# Patient Record
Sex: MALE | Race: WHITE | NOT HISPANIC OR LATINO | Employment: OTHER | URBAN - NONMETROPOLITAN AREA
[De-identification: names, ages, dates, MRNs, and addresses within clinical notes are randomized per-mention and may not be internally consistent; named-entity substitution may affect disease eponyms.]

---

## 2023-01-03 RX ORDER — SIMVASTATIN 40 MG
40 TABLET ORAL
COMMUNITY

## 2023-01-03 RX ORDER — CITALOPRAM 40 MG/1
40 TABLET ORAL DAILY
COMMUNITY

## 2023-01-03 RX ORDER — ASPIRIN 81 MG/1
81 TABLET ORAL DAILY
COMMUNITY
End: 2023-01-11

## 2023-01-03 RX ORDER — PANTOPRAZOLE SODIUM 20 MG/1
20 TABLET, DELAYED RELEASE ORAL DAILY
COMMUNITY

## 2023-01-03 NOTE — PRE-PROCEDURE INSTRUCTIONS
Pre-Surgery Instructions:   Medication Instructions   • aspirin (ECOTRIN LOW STRENGTH) 81 mg EC tablet Instructions provided by MD   • Bioflavonoid Products (VITAMIN C PLUS PO) Stop taking 7 days prior to surgery  • citalopram (CeleXA) 40 mg tablet Take night before surgery   • pantoprazole (PROTONIX) 20 mg tablet Take night before surgery   • simvastatin (ZOCOR) 40 mg tablet Take night before surgery   • VITAMIN D PO Stop taking 7 days prior to surgery  • Zinc Acetate, Oral, (ZINC ACETATE PO) Stop taking 7 days prior to surgery     See above    No aspirin or NSAIDS

## 2023-01-04 ENCOUNTER — ANESTHESIA EVENT (OUTPATIENT)
Dept: PERIOP | Facility: HOSPITAL | Age: 68
End: 2023-01-04

## 2023-01-04 DIAGNOSIS — Z01.818 PRE-OP EXAM: Primary | ICD-10-CM

## 2023-01-06 ENCOUNTER — APPOINTMENT (OUTPATIENT)
Dept: LAB | Facility: HOSPITAL | Age: 68
End: 2023-01-06

## 2023-01-06 DIAGNOSIS — Z01.818 PRE-OP EXAM: ICD-10-CM

## 2023-01-06 LAB
APTT PPP: 26 SECONDS (ref 23–37)
INR PPP: 0.93 (ref 0.84–1.19)
PROTHROMBIN TIME: 12.6 SECONDS (ref 11.6–14.5)

## 2023-01-11 ENCOUNTER — ANESTHESIA (OUTPATIENT)
Dept: PERIOP | Facility: HOSPITAL | Age: 68
End: 2023-01-11

## 2023-01-11 ENCOUNTER — HOSPITAL ENCOUNTER (OUTPATIENT)
Facility: HOSPITAL | Age: 68
Setting detail: OUTPATIENT SURGERY
Discharge: HOME | End: 2023-01-11
Attending: OTOLARYNGOLOGY | Admitting: OTOLARYNGOLOGY

## 2023-01-11 VITALS
OXYGEN SATURATION: 97 % | DIASTOLIC BLOOD PRESSURE: 83 MMHG | HEIGHT: 69 IN | TEMPERATURE: 97.4 F | BODY MASS INDEX: 27.11 KG/M2 | WEIGHT: 183 LBS | RESPIRATION RATE: 20 BRPM | HEART RATE: 68 BPM | SYSTOLIC BLOOD PRESSURE: 146 MMHG

## 2023-01-11 DIAGNOSIS — J38.3 OTHER DISEASES OF VOCAL CORDS: ICD-10-CM

## 2023-01-11 DIAGNOSIS — R59.0 LOCALIZED ENLARGED LYMPH NODES: ICD-10-CM

## 2023-01-11 DIAGNOSIS — R49.0 DYSPHONIA: ICD-10-CM

## 2023-01-11 LAB — GLUCOSE SERPL-MCNC: 97 MG/DL (ref 65–140)

## 2023-01-11 RX ORDER — HYDROMORPHONE HCL IN WATER/PF 6 MG/30 ML
0.2 PATIENT CONTROLLED ANALGESIA SYRINGE INTRAVENOUS
Status: DISCONTINUED | OUTPATIENT
Start: 2023-01-11 | End: 2023-01-11 | Stop reason: HOSPADM

## 2023-01-11 RX ORDER — METOCLOPRAMIDE HYDROCHLORIDE 5 MG/ML
10 INJECTION INTRAMUSCULAR; INTRAVENOUS ONCE AS NEEDED
Status: DISCONTINUED | OUTPATIENT
Start: 2023-01-11 | End: 2023-01-11 | Stop reason: HOSPADM

## 2023-01-11 RX ORDER — DEXAMETHASONE SODIUM PHOSPHATE 10 MG/ML
INJECTION, SOLUTION INTRAMUSCULAR; INTRAVENOUS AS NEEDED
Status: DISCONTINUED | OUTPATIENT
Start: 2023-01-11 | End: 2023-01-11

## 2023-01-11 RX ORDER — HYDROMORPHONE HCL/PF 1 MG/ML
0.5 SYRINGE (ML) INJECTION
Status: DISCONTINUED | OUTPATIENT
Start: 2023-01-11 | End: 2023-01-11 | Stop reason: HOSPADM

## 2023-01-11 RX ORDER — ALBUTEROL SULFATE 2.5 MG/3ML
2.5 SOLUTION RESPIRATORY (INHALATION) ONCE AS NEEDED
Status: DISCONTINUED | OUTPATIENT
Start: 2023-01-11 | End: 2023-01-11 | Stop reason: HOSPADM

## 2023-01-11 RX ORDER — ONDANSETRON 2 MG/ML
4 INJECTION INTRAMUSCULAR; INTRAVENOUS ONCE AS NEEDED
Status: DISCONTINUED | OUTPATIENT
Start: 2023-01-11 | End: 2023-01-11 | Stop reason: HOSPADM

## 2023-01-11 RX ORDER — FENTANYL CITRATE/PF 50 MCG/ML
50 SYRINGE (ML) INJECTION
Status: DISCONTINUED | OUTPATIENT
Start: 2023-01-11 | End: 2023-01-11 | Stop reason: HOSPADM

## 2023-01-11 RX ORDER — LIDOCAINE HYDROCHLORIDE 10 MG/ML
0.5 INJECTION, SOLUTION EPIDURAL; INFILTRATION; INTRACAUDAL; PERINEURAL ONCE AS NEEDED
Status: DISCONTINUED | OUTPATIENT
Start: 2023-01-11 | End: 2023-01-11 | Stop reason: HOSPADM

## 2023-01-11 RX ORDER — SERTRALINE HYDROCHLORIDE 100 MG/1
100 TABLET, FILM COATED ORAL DAILY
COMMUNITY
Start: 2022-10-19

## 2023-01-11 RX ORDER — SODIUM CHLORIDE, SODIUM LACTATE, POTASSIUM CHLORIDE, CALCIUM CHLORIDE 600; 310; 30; 20 MG/100ML; MG/100ML; MG/100ML; MG/100ML
125 INJECTION, SOLUTION INTRAVENOUS CONTINUOUS
Status: DISCONTINUED | OUTPATIENT
Start: 2023-01-11 | End: 2023-01-11 | Stop reason: HOSPADM

## 2023-01-11 RX ORDER — FENTANYL CITRATE 50 UG/ML
INJECTION, SOLUTION INTRAMUSCULAR; INTRAVENOUS AS NEEDED
Status: DISCONTINUED | OUTPATIENT
Start: 2023-01-11 | End: 2023-01-11

## 2023-01-11 RX ORDER — DIPHENHYDRAMINE HYDROCHLORIDE 50 MG/ML
12.5 INJECTION INTRAMUSCULAR; INTRAVENOUS ONCE AS NEEDED
Status: DISCONTINUED | OUTPATIENT
Start: 2023-01-11 | End: 2023-01-11 | Stop reason: HOSPADM

## 2023-01-11 RX ORDER — ONDANSETRON 2 MG/ML
INJECTION INTRAMUSCULAR; INTRAVENOUS AS NEEDED
Status: DISCONTINUED | OUTPATIENT
Start: 2023-01-11 | End: 2023-01-11

## 2023-01-11 RX ORDER — HYDROCODONE BITARTRATE AND ACETAMINOPHEN 5; 325 MG/1; MG/1
2 TABLET ORAL EVERY 6 HOURS PRN
Qty: 30 TABLET | Refills: 0 | Status: SHIPPED | OUTPATIENT
Start: 2023-01-11 | End: 2023-01-21

## 2023-01-11 RX ORDER — LIDOCAINE HYDROCHLORIDE 10 MG/ML
INJECTION, SOLUTION EPIDURAL; INFILTRATION; INTRACAUDAL; PERINEURAL AS NEEDED
Status: DISCONTINUED | OUTPATIENT
Start: 2023-01-11 | End: 2023-01-11

## 2023-01-11 RX ORDER — MIDAZOLAM HYDROCHLORIDE 2 MG/2ML
INJECTION, SOLUTION INTRAMUSCULAR; INTRAVENOUS AS NEEDED
Status: DISCONTINUED | OUTPATIENT
Start: 2023-01-11 | End: 2023-01-11

## 2023-01-11 RX ORDER — ROCURONIUM BROMIDE 10 MG/ML
INJECTION, SOLUTION INTRAVENOUS AS NEEDED
Status: DISCONTINUED | OUTPATIENT
Start: 2023-01-11 | End: 2023-01-11

## 2023-01-11 RX ORDER — FENTANYL CITRATE/PF 50 MCG/ML
25 SYRINGE (ML) INJECTION
Status: DISCONTINUED | OUTPATIENT
Start: 2023-01-11 | End: 2023-01-11 | Stop reason: HOSPADM

## 2023-01-11 RX ORDER — PROPOFOL 10 MG/ML
INJECTION, EMULSION INTRAVENOUS AS NEEDED
Status: DISCONTINUED | OUTPATIENT
Start: 2023-01-11 | End: 2023-01-11

## 2023-01-11 RX ORDER — CEPHALEXIN 500 MG/1
500 CAPSULE ORAL EVERY 12 HOURS SCHEDULED
Qty: 20 CAPSULE | Refills: 0 | Status: SHIPPED | OUTPATIENT
Start: 2023-01-11 | End: 2023-01-21

## 2023-01-11 RX ADMIN — FENTANYL CITRATE 100 MCG: 50 INJECTION INTRAMUSCULAR; INTRAVENOUS at 07:41

## 2023-01-11 RX ADMIN — SODIUM CHLORIDE, SODIUM LACTATE, POTASSIUM CHLORIDE, AND CALCIUM CHLORIDE: .6; .31; .03; .02 INJECTION, SOLUTION INTRAVENOUS at 07:30

## 2023-01-11 RX ADMIN — SUGAMMADEX 400 MG: 100 INJECTION, SOLUTION INTRAVENOUS at 08:00

## 2023-01-11 RX ADMIN — LIDOCAINE HYDROCHLORIDE 50 MG: 10 INJECTION, SOLUTION EPIDURAL; INFILTRATION; INTRACAUDAL at 07:41

## 2023-01-11 RX ADMIN — PROPOFOL 80 MG: 10 INJECTION, EMULSION INTRAVENOUS at 07:54

## 2023-01-11 RX ADMIN — ONDANSETRON 4 MG: 2 INJECTION INTRAMUSCULAR; INTRAVENOUS at 07:41

## 2023-01-11 RX ADMIN — PROPOFOL 120 MG: 10 INJECTION, EMULSION INTRAVENOUS at 07:41

## 2023-01-11 RX ADMIN — ROCURONIUM BROMIDE 40 MG: 10 INJECTION, SOLUTION INTRAVENOUS at 07:41

## 2023-01-11 RX ADMIN — DEXAMETHASONE SODIUM PHOSPHATE 10 MG: 10 INJECTION, SOLUTION INTRAMUSCULAR; INTRAVENOUS at 07:41

## 2023-01-11 RX ADMIN — MIDAZOLAM 2 MG: 1 INJECTION INTRAMUSCULAR; INTRAVENOUS at 07:32

## 2023-01-11 NOTE — DISCHARGE INSTR - AVS FIRST PAGE
Use voice conservatively and avoid extremes of speech such as whispering or shouting  The Band-Aid can be removed later today and you can shower normally

## 2023-01-11 NOTE — DISCHARGE SUMMARY
Discharge Summary - Maykel Cordero 74 y o  male MRN: [id number]    Unit/Bed#: [facility] Encounter: [id number]    Admission Date:     Admitting Diagnosis: Dysphonia [R49 0]  Other diseases of vocal cords [J38 3]  Localized enlarged lymph nodes [R59 0]    HPI: Status post MDL with left vocal cord biopsy, flexible esophagoscopy, flexible bronchoscopy and fine-needle aspiration of left submandibular lymph node  Procedures Performed: No orders of the defined types were placed in this encounter  Summary of Hospital Course: Unremarkable    Significant Findings, Care, Treatment and Services Provided: Surgery    Complications: None    Discharge Diagnosis: Vocal lesion, reflux and COPD    Medical Problems     Resolved Problems  Date Reviewed: 1/10/2023   None         Condition at Discharge: good         Discharge instructions/Information to patient and family:   See after visit summary for information provided to patient and family  Provisions for Follow-Up Care:  See after visit summary for information related to follow-up care and any pertinent home health orders  PCP: No primary care provider on file  Disposition: Home    Planned Readmission: No      Discharge Statement   I spent 15 minutes discharging the patient  This time was spent on the day of discharge  I had direct contact with the patient on the day of discharge  Additional documentation is required if more than 30 minutes were spent on discharge  Discharge Medications:  See after visit summary for reconciled discharge medications provided to patient and family

## 2023-01-11 NOTE — OP NOTE
OPERATIVE REPORT  PATIENT NAME: Burton Desouza    :  1955  MRN: [id number]  Pt Location: [facility] OR ROOM 02    SURGERY DATE: 2023    Surgeon(s) and Role:     * Yulissa Merino MD - Primary    Preop Diagnosis:  Dysphonia [R49 0]  Other diseases of vocal cords [J38 3]  Localized enlarged lymph nodes [R59 0]    Post-Op Diagnosis Codes: * Dysphonia [R49 0]     * Other diseases of vocal cords [J38 3]     * Localized enlarged lymph nodes [R59 0]    Procedure(s) (LRB):  MICRO DIRECT LARYNGOSCOPY WITH VOCAL FOLD STRIPPING WITH FROZEN SECTION ANALYSIS; FLEXIBLE ESOPHAGOSCOPY; FLEXIBLE BRONCHOSCOPY (N/A)  SUBMANDIBULAR GLAND FINE NEEDLE ASPIRATION (Left)    Specimen(s):  * No specimens in log *    Estimated Blood Loss:   Minimal    Drains:  * No LDAs found *    Anesthesia Type:   General    Operative Indications:  Dysphonia [R49 0]  Other diseases of vocal cords [J38 3]  Localized enlarged lymph nodes [R59 0]      Operative Findings:      Complications:   None    Procedure and Technique:  The patient was identified and taken to the operative suite  A timeout weas called  A 6 0 endotracheal tube was used for intubation after the successful induction of general anesthesia and it was taped to the left lower lip  The table was turned and the [facility] blade was used to cannulate the esophageal inlet and the flexible esophagoscope was inserted into the esophagus under direct visualization  Using air insufflation, the esophagus was examined circumferentially to the GE junction  The GE junction was found to be irregular  There was not a hiatal hernia present  The stomach was examined to the pylorus and retroflexion showed the body and antrum as well  The scope was then withdrawn  Next, the Dedo laryngoscope was placed into position, offering adequate visualization of the vocal cords  The suspension apparatus was attached and suspended from the [employer] stand    Using the microscope on a setting of 400, the roshan cords were visualized  The left vocal folds was stripped anteriorly to posteriorly, sparing the vocalis muscle, removing the leukoplakic lesion    Next, the slim bronchoscope was used, with the [facility] blade's guidance to go around the endotracheal tube with the cuff deflated and visualized the tracheal rings and grady which were sharp  Segmental bronchi bilaterally were visualized and found to be without lesions, although mucous plugging was relieved  The scope was withdrawn  The vocal folds were reinspected and no significant bleeding was encountered  The suspension apparatus was relieved, the microscope withdrawn and the Dedo laryngoscope withdrawn and an oral airway was placed  Next, an alcohol swab in the submandibular triangle on the left side was used to clean the skin a 22-gauge needle on a 10 cc syringe was inserted into the submandibular area of concern  Multiple passes were made through and the specimen was placed into CytoLyt  The site was cleaned and a Band-Aid was placed  There was no additional bleeding  The patient was awakened and extubated without incident and taken to the PACU in excellent condition  Instrument and sponge counts were correct x2 at the end of the case     I was present for the entire procedure    Patient Disposition:  PACU         SIGNATURE: Lacy Layton MD  DATE: January 11, 2023  TIME: 7:36 AM

## 2023-01-11 NOTE — INTERVAL H&P NOTE
H&P reviewed  After examining the patient I find no changes in the patients condition since the H&P had been written      Vitals:    01/11/23 0622   BP: 133/78   Pulse: 69   Resp: 20   Temp: 97 5 °F (36 4 °C)   SpO2: 97%

## 2023-01-11 NOTE — ANESTHESIA POSTPROCEDURE EVALUATION
Post-Op Assessment Note    CV Status:  Stable  Pain Score: 0    Pain management: adequate     Mental Status:  Alert and awake   Hydration Status:  Euvolemic   PONV Controlled:  Controlled   Airway Patency:  Patent      Post Op Vitals Reviewed: Yes      Staff: CRNA         No notable events documented      /80 (01/11/23 0810)    Temp 98 1 °F (36 7 °C) (01/11/23 0810)    Pulse 64 (01/11/23 0810)   Resp 20 (01/11/23 0810)    SpO2 98 % (01/11/23 0810)

## 2023-01-11 NOTE — ANESTHESIA PREPROCEDURE EVALUATION
Procedure:  MICRO DIRECT LARYNGOSCOPY WITH VOCAL FOLD STRIPPING WITH FROZEN SECTION ANALYSIS; FLEXIBLE ESOPHAGOSCOPY; FLEXIBLE BRONCHOSCOPY (Throat)  SUBMANDIBULAR GLAND FINE NEEDLE ASPIRATION (Left: Neck)    Relevant Problems   ANESTHESIA (within normal limits)      CARDIO   (+) Hyperlipidemia      ENDO (within normal limits)      GI/HEPATIC   (+) GERD (gastroesophageal reflux disease)      /RENAL (within normal limits)      HEMATOLOGY (within normal limits)      MUSCULOSKELETAL (within normal limits)      NEURO/PSYCH   (+) Anxiety      PULMONARY (within normal limits)      Other   (+) Neck mass        Physical Exam    Airway    Mallampati score: II  TM Distance: >3 FB  Neck ROM: full     Dental   No notable dental hx     Cardiovascular  Rhythm: regular, Rate: normal, Cardiovascular exam normal    Pulmonary  Pulmonary exam normal Breath sounds clear to auscultation,     Other Findings        Anesthesia Plan  ASA Score- 2     Anesthesia Type- general with ASA Monitors  Additional Monitors:   Airway Plan: ETT  Plan Factors-Exercise tolerance (METS): >4 METS  Chart reviewed  Existing labs reviewed  Patient summary reviewed  Induction- intravenous  Postoperative Plan- Plan for postoperative opioid use  Informed Consent- Anesthetic plan and risks discussed with patient  I personally reviewed this patient with the CRNA  Discussed and agreed on the Anesthesia Plan with the CRNA  Modesto Uriarte Recent labs personally reviewed:  No results found for: WBC, HGB, PLT  No results found for: NA, K, BUN, CREATININE, GLUCOSE  Lab Results   Component Value Date    PTT 26 01/06/2023      Lab Results   Component Value Date    INR 0 93 01/06/2023       Lab Results   Component Value Date    HGBA1C 5 9 (H) 08/11/2022       I, Regan Peck MD, have personally seen and evaluated the patient prior to anesthetic care    I have reviewed the pre-anesthetic record, and other medical records if appropriate to the anesthetic care  If a CRNA is involved in the case, I have reviewed the CRNA assessment, if present, and agree  Risks/benefits and alternatives discussed with patient including possible PONV, sore throat, and possibility of rare anesthetic and surgical emergencies

## 2023-01-23 NOTE — ADDENDUM NOTE
Addendum  created 01/23/23 0737 by July Guzman CRNA    Clinical Note Signed, Intraprocedure Event edited, Intraprocedure Meds edited

## 2023-02-14 ENCOUNTER — OFFICE VISIT (OUTPATIENT)
Dept: URGENT CARE | Facility: CLINIC | Age: 68
End: 2023-02-14

## 2023-02-14 VITALS
BODY MASS INDEX: 26.66 KG/M2 | DIASTOLIC BLOOD PRESSURE: 90 MMHG | OXYGEN SATURATION: 99 % | HEART RATE: 88 BPM | TEMPERATURE: 97.2 F | RESPIRATION RATE: 16 BRPM | WEIGHT: 180 LBS | SYSTOLIC BLOOD PRESSURE: 132 MMHG | HEIGHT: 69 IN

## 2023-02-14 DIAGNOSIS — K52.9 NONINFECTIOUS GASTROENTERITIS, UNSPECIFIED TYPE: Primary | ICD-10-CM

## 2023-02-14 RX ORDER — CETIRIZINE HYDROCHLORIDE 10 MG/1
10 TABLET ORAL DAILY
COMMUNITY
Start: 2023-02-02

## 2023-02-14 RX ORDER — MAGNESIUM HYDROXIDE/ALUMINUM HYDROXICE/SIMETHICONE 120; 1200; 1200 MG/30ML; MG/30ML; MG/30ML
30 SUSPENSION ORAL ONCE
Status: COMPLETED | OUTPATIENT
Start: 2023-02-14 | End: 2023-02-14

## 2023-02-14 RX ADMIN — MAGNESIUM HYDROXIDE/ALUMINUM HYDROXICE/SIMETHICONE 30 ML: 120; 1200; 1200 SUSPENSION ORAL at 13:21

## 2023-02-14 NOTE — PATIENT INSTRUCTIONS
Continue with supportive measures, OTC Tylenol, increased fluid intake and rest   Advance diet as tolerated but start with bland foods   Follow up with PCP in 3-5 days  Present to ER if symptoms worsen     Gastroenteritis   AMBULATORY CARE:   Gastroenteritis , or stomach flu, is an infection of the stomach and intestines  It is caused by bacteria, parasites, or viruses  Common symptoms include the following:   Diarrhea or gas    Nausea, vomiting, or poor appetite    Abdominal cramps, pain, or gurgling    Fever    Tiredness or weakness    Headaches or muscle aches with any of the above symptoms    Call 911 for any of the following: You have trouble breathing or a very fast pulse  Seek care immediately if:   You see blood in your diarrhea  You cannot stop vomiting  You have not urinated for 12 hours  You feel like you are going to faint  Contact your healthcare provider if:   You have a fever  You continue to vomit or have diarrhea, even after treatment  You see worms in your diarrhea  Your mouth or eyes are dry  You are not urinating as much or as often  You have questions or concerns about your condition or care  Treatment for gastroenteritis  may include medicines to slow or stop your diarrhea or vomiting  You may also need medicines to treat an infection caused by bacteria or a parasite  Manage your symptoms:   Drink liquids as directed  Ask your healthcare provider how much liquid to drink each day, and which liquids are best for you  You may also need to drink an oral rehydration solution (ORS)  An ORS has the right amounts of sugar, salt, and minerals in water to replace body fluids  Eat bland foods  When you feel hungry, begin eating soft, bland foods  Examples are bananas, clear soup, potatoes, and applesauce  Do not have dairy products, alcohol, sugary drinks, or drinks with caffeine until you feel better  Rest as much as possible    Slowly start to do more each day when you begin to feel better  Prevent the spread of germs:  Gastroenteritis can spread easily  Keep yourself, your family, and your surroundings clean to help prevent the spread of gastroenteritis:  Wash your hands often  Use soap and water  Wash your hands after you use the bathroom, change a child's diapers, or sneeze  Wash your hands before you prepare or eat food  Clean surfaces and do laundry often  Wash your clothes and towels separately from the rest of the laundry  Clean surfaces in your home with antibacterial  or bleach  Clean food thoroughly and cook safely  Wash raw vegetables before you cook  Cook meat, fish, and eggs fully  Do not use the same dishes for raw meat as you do for other foods  Refrigerate any leftover food immediately  Be aware when you camp or travel  Drink only clean water  Do not drink from rivers or lakes unless you purify or boil the water first  When you travel, drink bottled water and do not add ice  Do not eat fruit that has not been peeled  Do not eat raw fish or meat that is not fully cooked  Follow up with your doctor as directed:  Write down your questions so you remember to ask them during your visits  © Copyright [employer] 2022 Information is for End User's use only and may not be sold, redistributed or otherwise used for commercial purposes  All illustrations and images included in CareNotes® are the copyrighted property of [employer]  or Missy Borges  The above information is an  only  It is not intended as medical advice for individual conditions or treatments  Talk to your doctor, nurse or pharmacist before following any medical regimen to see if it is safe and effective for you

## 2023-02-14 NOTE — PROGRESS NOTES
[facility]        NAME: Bennie Baumgarten is a 74 y o  male  : 1955    MRN: [id number]  DATE: 2023  TIME: 1:21 PM    Assessment and Plan   Noninfectious gastroenteritis, unspecified type [K52 9]  1  Noninfectious gastroenteritis, unspecified type  aluminum-magnesium hydroxide-simethicone (MYLANTA) oral suspension 30 mL        Mylanta given in clinic  Symptoms likely related to viral etiology and encouraged continued supportive measures  Advance diet as tolerated  Increase fluid intake  Follow up with PCP in 3-5 days or proceed to ED if symptoms worsen  Patient verbalized understanding of instructions given  Patient Instructions     Patient Instructions     Continue with supportive measures, OTC Tylenol, increased fluid intake and rest   Advance diet as tolerated but start with bland foods   Follow up with PCP in 3-5 days  Present to ER if symptoms worsen     Gastroenteritis   AMBULATORY CARE:   Gastroenteritis , or stomach flu, is an infection of the stomach and intestines  It is caused by bacteria, parasites, or viruses  Common symptoms include the following:   · Diarrhea or gas    · Nausea, vomiting, or poor appetite    · Abdominal cramps, pain, or gurgling    · Fever    · Tiredness or weakness    · Headaches or muscle aches with any of the above symptoms    Call 911 for any of the following:   · You have trouble breathing or a very fast pulse  Seek care immediately if:   · You see blood in your diarrhea  · You cannot stop vomiting  · You have not urinated for 12 hours  · You feel like you are going to faint  Contact your healthcare provider if:   · You have a fever  · You continue to vomit or have diarrhea, even after treatment  · You see worms in your diarrhea  · Your mouth or eyes are dry  You are not urinating as much or as often  · You have questions or concerns about your condition or care      Treatment for gastroenteritis  may include medicines to slow or stop your diarrhea or vomiting  You may also need medicines to treat an infection caused by bacteria or a parasite  Manage your symptoms:   · Drink liquids as directed  Ask your healthcare provider how much liquid to drink each day, and which liquids are best for you  You may also need to drink an oral rehydration solution (ORS)  An ORS has the right amounts of sugar, salt, and minerals in water to replace body fluids  · Eat bland foods  When you feel hungry, begin eating soft, bland foods  Examples are bananas, clear soup, potatoes, and applesauce  Do not have dairy products, alcohol, sugary drinks, or drinks with caffeine until you feel better  · Rest as much as possible  Slowly start to do more each day when you begin to feel better  Prevent the spread of germs:  Gastroenteritis can spread easily  Keep yourself, your family, and your surroundings clean to help prevent the spread of gastroenteritis:  · Wash your hands often  Use soap and water  Wash your hands after you use the bathroom, change a child's diapers, or sneeze  Wash your hands before you prepare or eat food  · Clean surfaces and do laundry often  Wash your clothes and towels separately from the rest of the laundry  Clean surfaces in your home with antibacterial  or bleach  · Clean food thoroughly and cook safely  Wash raw vegetables before you cook  Cook meat, fish, and eggs fully  Do not use the same dishes for raw meat as you do for other foods  Refrigerate any leftover food immediately  · Be aware when you camp or travel  Drink only clean water  Do not drink from rivers or lakes unless you purify or boil the water first  When you travel, drink bottled water and do not add ice  Do not eat fruit that has not been peeled  Do not eat raw fish or meat that is not fully cooked  Follow up with your doctor as directed:  Write down your questions so you remember to ask them during your visits  © Copyright [employer] 2022 Information is for End User's use only and may not be sold, redistributed or otherwise used for commercial purposes  All illustrations and images included in CareNotes® are the copyrighted property of [employer]  or Missy Borges  The above information is an  only  It is not intended as medical advice for individual conditions or treatments  Talk to your doctor, nurse or pharmacist before following any medical regimen to see if it is safe and effective for you  Chief Complaint     Chief Complaint   Patient presents with   • Abdominal Pain     Periumbilical abdominal pain intermittently x 1 week with diarrhea x 2 daily  No nausea         History of Present Illness       81-year-old male with a past medical history significant for GERD presents with complaints of intermittent episodes of generalized abdominal pain, nausea, and diarrhea x4 days  Patient describes abdominal pain as "growling" and is accompanied by belching and sour taste in mouth  States symptoms are sporadic and he has been having about 2 episodes of diarrhea daily  Symptoms do not occur following food intake as he has had decreased appetite but is drinking well  He has been taking OTC Imodium and Pepto-Bismol  Denies any fever, chills, vomiting, or blood in stool  No chest pain or shortness of breath  Denies any known sick contacts or exposures although wife is currently ill however with differing symptoms  Review of Systems   Review of Systems   Constitutional: Negative for chills and fever  Respiratory: Negative for shortness of breath  Cardiovascular: Negative for chest pain  Gastrointestinal: Positive for abdominal pain, diarrhea and nausea  Negative for blood in stool and vomiting  Genitourinary: Negative for decreased urine volume and difficulty urinating  Musculoskeletal: Negative for myalgias  Skin: Negative for rash           Current Medications Current Outpatient Medications:   •  Bioflavonoid Products (VITAMIN C PLUS PO), Take 1 tablet by mouth in the morning, Disp: , Rfl:   •  cetirizine (ZyrTEC) 10 mg tablet, Take 10 mg by mouth daily, Disp: , Rfl:   •  citalopram (CeleXA) 40 mg tablet, Take 40 mg by mouth daily, Disp: , Rfl:   •  pantoprazole (PROTONIX) 20 mg tablet, Take 20 mg by mouth daily, Disp: , Rfl:   •  sertraline (ZOLOFT) 100 mg tablet, Take 100 mg by mouth daily, Disp: , Rfl:   •  simvastatin (ZOCOR) 40 mg tablet, Take 40 mg by mouth daily at bedtime, Disp: , Rfl:   •  VITAMIN D PO, Take 1 tablet by mouth in the morning, Disp: , Rfl:   •  Zinc Acetate, Oral, (ZINC ACETATE PO), Take 1 tablet by mouth in the morning, Disp: , Rfl:     Current Facility-Administered Medications:   •  aluminum-magnesium hydroxide-simethicone (MYLANTA) oral suspension 30 mL, 30 mL, Oral, Once, Ysidro Cockayne, CRNP    Current Allergies     Allergies as of 02/14/2023   • (No Known Allergies)            The following portions of the patient's history were reviewed and updated as appropriate: allergies, current medications, past family history, past medical history, past social history, past surgical history and problem list      Past Medical History:   Diagnosis Date   • Anxiety    • COVID-19     pt had in 2021- does not remember date   • GERD (gastroesophageal reflux disease)    • Hyperlipidemia    • Kidney stone     Pt reports it passed on it's own   • Neck mass    • Vitamin D deficiency    • Wears partial dentures        Past Surgical History:   Procedure Laterality Date   • CARPAL TUNNEL RELEASE Bilateral    • KNEE ARTHROSCOPY Right    • [place] [address] EXC BAN&/STRPG CORDS/EPIGL MCRSCP/TLSCP N/A 1/11/2023    Procedure: MICRO DIRECT LARYNGOSCOPY WITH VOCAL FOLD STRIPPING WITH FROZEN SECTION ANALYSIS; FLEXIBLE ESOPHAGOSCOPY; FLEXIBLE BRONCHOSCOPY;  Surgeon: Jimmy Howell MD;  Location:  MAIN OR;  Service: ENT   • SUBMANDIBULAR GLAND EXCISION Left 1/11/2023 Procedure: SUBMANDIBULAR GLAND FINE NEEDLE ASPIRATION;  Surgeon: Rafael Patel MD;  Location:  MAIN OR;  Service: ENT       History reviewed  No pertinent family history  Medications have been verified  Objective   /90   Pulse 88   Temp (!) 97 2 °F (36 2 °C)   Resp 16   Ht 5' 9" (1 753 m)   Wt 81 6 kg (180 lb)   SpO2 99%   BMI 26 58 kg/m²   No LMP for male patient  Physical Exam     Physical Exam  Vitals and nursing note reviewed  Constitutional:       General: He is not in acute distress  Appearance: He is not toxic-appearing  HENT:      Head: Normocephalic  Right Ear: Tympanic membrane, ear canal and external ear normal       Left Ear: Tympanic membrane, ear canal and external ear normal       Nose: Nose normal       Mouth/Throat:      Mouth: Mucous membranes are moist       Pharynx: Oropharynx is clear  No posterior oropharyngeal erythema  Eyes:      Conjunctiva/sclera: Conjunctivae normal    Cardiovascular:      Rate and Rhythm: Normal rate and regular rhythm  Heart sounds: Normal heart sounds  Pulmonary:      Effort: Pulmonary effort is normal  No respiratory distress  Breath sounds: Normal breath sounds  No stridor  No wheezing, rhonchi or rales  Abdominal:      General: Bowel sounds are normal       Palpations: Abdomen is soft  Tenderness: There is no abdominal tenderness  Lymphadenopathy:      Cervical: No cervical adenopathy  Skin:     General: Skin is warm and dry  Neurological:      Mental Status: He is alert and oriented to person, place, and time  Gait: Gait is intact     Psychiatric:         Mood and Affect: Mood normal          Behavior: Behavior normal

## 2023-10-31 ENCOUNTER — OFFICE VISIT (OUTPATIENT)
Dept: URGENT CARE | Facility: CLINIC | Age: 68
End: 2023-10-31
Payer: COMMERCIAL

## 2023-10-31 VITALS
SYSTOLIC BLOOD PRESSURE: 144 MMHG | TEMPERATURE: 97 F | HEART RATE: 85 BPM | DIASTOLIC BLOOD PRESSURE: 82 MMHG | RESPIRATION RATE: 18 BRPM | OXYGEN SATURATION: 97 %

## 2023-10-31 DIAGNOSIS — Z23 ENCOUNTER FOR IMMUNIZATION: ICD-10-CM
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
## 2023-10-31 DIAGNOSIS — S30.861A TICK BITE OF ABDOMEN, INITIAL ENCOUNTER: Primary | ICD-10-CM

## 2023-10-31 PROCEDURE — S9088 SERVICES PROVIDED IN URGENT: HCPCS

## 2023-10-31 PROCEDURE — 90715 TDAP VACCINE 7 YRS/> IM: CPT

## 2023-10-31 PROCEDURE — 90471 IMMUNIZATION ADMIN: CPT

## 2023-10-31 PROCEDURE — 99213 OFFICE O/P EST LOW 20 MIN: CPT

## 2023-10-31 RX ORDER — DOXYCYCLINE 100 MG/1
100 TABLET ORAL 2 TIMES DAILY
Qty: 28 TABLET | Refills: 0 | Status: SHIPPED | OUTPATIENT
Start: 2023-10-31 | End: 2023-11-14

## 2023-10-31 NOTE — PROGRESS NOTES
[facility]        NAME: Cass Mukherjee is a 79 y.o. male  : 1955    MRN: [id number]  DATE: 2023  TIME: 3:08 PM    Assessment and Plan   Tick bite of abdomen, initial encounter [R21.121Y, W57. XXXA]  1. Tick bite of abdomen, initial encounter  doxycycline (ADOXA) 100 MG tablet      2. Encounter for immunization  Tdap Vaccine greater than or equal to 6yo        Discussed problem with patient. No signs of foreign bodies left in tick bite area. Advised patient to not attempt to remove future ticks for dirty instruments. Tdap updated today. Placing on doxycycline for Lyme disease prophylaxis. Advised to keep wound clean and dry using soap and water as well as using antibiotic ointment twice a day. Follow-up with PCP as needed for this problem. Patient Instructions       Follow up with PCP in 3-5 days. Proceed to  ER if symptoms worsen. Chief Complaint     Chief Complaint   Patient presents with   • Tick Bite     C/o tick on abdomen that pt removed on Saturday; however, he is unsure if he removed the entirety of it. Also c/o nausea at this time         History of Present Illness       C/o tick on abdomen that pt removed on Saturday; however, he is unsure if he removed the entirety of it. Also c/o nausea at this time. Had 1 episode of nausea that occurred this morning and resolved on its own. Has not felt nauseous all day. Patient states he removed the tick using a knife at his hunting trailer. Has been doing nothing for wound management. Tick Bite  Associated symptoms include nausea. Pertinent negatives include no abdominal pain, chest pain, chills, coughing, fatigue, fever or vomiting. Review of Systems   Review of Systems   Constitutional:  Negative for appetite change, chills, fatigue and fever. Respiratory:  Negative for cough, shortness of breath, wheezing and stridor. Cardiovascular:  Negative for chest pain and palpitations.    Gastrointestinal:  Positive for nausea. Negative for abdominal pain, constipation, diarrhea and vomiting. Skin:  Positive for wound.          Current Medications       Current Outpatient Medications:   •  Bioflavonoid Products (VITAMIN C PLUS PO), Take 1 tablet by mouth in the morning, Disp: , Rfl:   •  cetirizine (ZyrTEC) 10 mg tablet, Take 10 mg by mouth daily, Disp: , Rfl:   •  citalopram (CeleXA) 40 mg tablet, Take 40 mg by mouth daily, Disp: , Rfl:   •  doxycycline (ADOXA) 100 MG tablet, Take 1 tablet (100 mg total) by mouth 2 (two) times a day for 14 days, Disp: 28 tablet, Rfl: 0  •  pantoprazole (PROTONIX) 20 mg tablet, Take 20 mg by mouth daily, Disp: , Rfl:   •  sertraline (ZOLOFT) 100 mg tablet, Take 100 mg by mouth daily, Disp: , Rfl:   •  simvastatin (ZOCOR) 40 mg tablet, Take 40 mg by mouth daily at bedtime, Disp: , Rfl:   •  VITAMIN D PO, Take 1 tablet by mouth in the morning, Disp: , Rfl:   •  Zinc Acetate, Oral, (ZINC ACETATE PO), Take 1 tablet by mouth in the morning, Disp: , Rfl:     Current Allergies     Allergies as of 10/31/2023   • (No Known Allergies)            The following portions of the patient's history were reviewed and updated as appropriate: allergies, current medications, past family history, past medical history, past social history, past surgical history and problem list.     Past Medical History:   Diagnosis Date   • Anxiety    • COVID-19     pt had in 2021- does not remember date   • GERD (gastroesophageal reflux disease)    • Hyperlipidemia    • Kidney stone     Pt reports it passed on it's own   • Neck mass    • Vitamin D deficiency    • Wears partial dentures        Past Surgical History:   Procedure Laterality Date   • CARPAL TUNNEL RELEASE Bilateral    • KNEE ARTHROSCOPY Right    •  [place] EXC BAN&/STRPG CORDS/EPIGL MCRSCP/TLSCP N/A 1/11/2023    Procedure: MICRO DIRECT LARYNGOSCOPY WITH VOCAL FOLD STRIPPING WITH FROZEN SECTION ANALYSIS; FLEXIBLE ESOPHAGOSCOPY; FLEXIBLE BRONCHOSCOPY;  Surgeon: Álvaro Castillo Ayla Blanco MD;  Location:  MAIN OR;  Service: ENT   • SUBMANDIBULAR GLAND EXCISION Left 1/11/2023    Procedure: SUBMANDIBULAR GLAND FINE NEEDLE ASPIRATION;  Surgeon: Mahin Garrido MD;  Location:  MAIN OR;  Service: ENT       History reviewed. No pertinent family history. Medications have been verified. Objective   /82   Pulse 85   Temp (!) 97 °F (36.1 °C)   Resp 18   SpO2 97%        Physical Exam     Physical Exam  Vitals and nursing note reviewed. Constitutional:       General: He is not in acute distress. Appearance: Normal appearance. He is normal weight. He is not ill-appearing, toxic-appearing or diaphoretic. HENT:      Head: Normocephalic. Cardiovascular:      Rate and Rhythm: Normal rate and regular rhythm. Pulses: Normal pulses. Heart sounds: Normal heart sounds. No murmur heard. No friction rub. No gallop. Pulmonary:      Effort: Pulmonary effort is normal. No respiratory distress. Breath sounds: Normal breath sounds. No stridor. No wheezing, rhonchi or rales. Chest:      Chest wall: No tenderness. Abdominal:          Comments: 1 cm sized abrasion the patient's abdomen. Area was probed and no signs of foreign body. Bruising around site but no erythema, discharge, warmth. Mildly tender to palpation. Neurological:      Mental Status: He is alert.

## 2024-01-29 ENCOUNTER — OFFICE VISIT (OUTPATIENT)
Dept: URGENT CARE | Facility: CLINIC | Age: 69
End: 2024-01-29
Payer: COMMERCIAL

## 2024-01-29 VITALS
HEIGHT: 69 IN | WEIGHT: 176 LBS | DIASTOLIC BLOOD PRESSURE: 80 MMHG | HEART RATE: 83 BPM | SYSTOLIC BLOOD PRESSURE: 134 MMHG | RESPIRATION RATE: 16 BRPM | OXYGEN SATURATION: 98 % | BODY MASS INDEX: 26.07 KG/M2

## 2024-01-29 DIAGNOSIS — R07.89 BURNING CHEST PAIN: Primary | ICD-10-CM

## 2024-01-29 PROCEDURE — 93005 ELECTROCARDIOGRAM TRACING: CPT

## 2024-01-29 PROCEDURE — S9088 SERVICES PROVIDED IN URGENT: HCPCS

## 2024-01-29 PROCEDURE — 99213 OFFICE O/P EST LOW 20 MIN: CPT

## 2024-01-29 RX ORDER — MAGNESIUM HYDROXIDE/ALUMINUM HYDROXICE/SIMETHICONE 120; 1200; 1200 MG/30ML; MG/30ML; MG/30ML
30 SUSPENSION ORAL ONCE
Status: COMPLETED | OUTPATIENT
Start: 2024-01-29 | End: 2024-01-29

## 2024-01-29 RX ADMIN — MAGNESIUM HYDROXIDE/ALUMINUM HYDROXICE/SIMETHICONE 30 ML: 120; 1200; 1200 SUSPENSION ORAL at 13:37

## 2024-01-29 NOTE — PATIENT INSTRUCTIONS
Do not drive yourself   Follow up with PCP in 3-5 days.  Proceed to ER for further evaluation and management of symptoms     Chest Pain   AMBULATORY CARE:   Chest pain  can be caused by a range of conditions, from not serious to life-threatening. It is important to follow up with your healthcare provider to find the cause of your chest pain.  Common symptoms you may have with chest pain:   Fever or sweating    Nausea or vomiting    Shortness of breath    Discomfort or pressure that spreads from your chest to your back, jaw, or arm    A racing or slow heartbeat    Feeling weak, tired, or faint    Call your local emergency number (911 in the [place]) or have someone call if:   You have any of the following signs of a heart attack:      Squeezing, pressure, or pain in your chest    You may  also have any of the following:     Discomfort or pain in your back, neck, jaw, stomach, or arm    Shortness of breath    Nausea or vomiting    Lightheadedness or a sudden cold sweat      Seek care immediately if:   You have chest discomfort that gets worse, even with medicine.    You cough or vomit blood.    Your bowel movements are black or bloody.    You cannot stop vomiting, or it hurts to swallow.    Call your doctor if:   You have questions or concerns about your condition or care.      Treatment for chest pain  may include medicine to treat your symptoms while your healthcare provider finds the cause of your chest pain.  Medicines  may be given to treat the cause of your chest pain. Examples include pain medicine, anxiety medicine, or medicines to increase blood flow to your heart.    Do not take certain medicines without asking your healthcare provider first.  These include NSAIDs, herbal or vitamin supplements, and hormones, such as estrogen or progestin.    One or more stents  may need to be placed in your heart if pain was caused by blockage. A stent is a wire mesh tube that helps hold your artery open.    Healthy living  tips:  If the cause of your chest pain is known, your healthcare provider will give you specific guidelines to follow. The following are general healthy guidelines:  Do not smoke.  Nicotine and other chemicals in cigarettes and cigars can cause lung and heart damage. Ask your healthcare provider for information if you currently smoke and need help to quit. E-cigarettes or smokeless tobacco still contain nicotine. Talk to your healthcare provider before you use these products.    Choose a variety of healthy foods as often as possible.  Include fresh, frozen, or canned fruits and vegetables. Also include low-fat dairy products, fish, chicken (without skin), and lean meats. Your healthcare provider or a dietitian can help you create meal plans. You may need to avoid certain foods or drinks if your pain is caused by a digestion problem.         Lower your sodium (salt) intake.  Limit foods that are high in sodium, such as canned foods, salty snacks, and cold cuts. If you add salt when you cook food, do not add more at the table. Choose low-sodium canned foods as much as possible.         Drink plenty of water every day.  Water helps your body to control your temperature and blood pressure. Ask your healthcare provider how much water you should drink every day.    Ask about activity.  Your healthcare provider will tell you which activities to limit or avoid. Ask when you can drive, return to work, and have sex. Ask about the best exercise plan for you.    Maintain a healthy weight.  Ask your healthcare provider what a healthy weight is for you. Ask him or her to help you create a safe weight loss plan if you are overweight.    Ask about vaccines you may need.  Your healthcare provider can tell you which vaccines you need, and when to get them. The following vaccines help prevent diseases that can become serious for a person with a heart condition:    The influenza (flu) vaccine is given each year.  Get a flu vaccine as  soon as recommended, usually in September or October.    The pneumonia vaccine is usually given every 5 years.  Your healthcare provider may recommend the pneumonia vaccine if you are 65 or older.    COVID-19 vaccines are given to adults as a shot in 1 or 2 doses.  Vaccination is recommended for all adults. A booster (additional) dose is also recommended for all adults. A second booster is recommended for all adults 50 or older and for immunocompromised adults 18 or older. The second booster is also recommended for adults who received the 1-dose vaccine for the first and booster doses. Your healthcare provider can tell you when to get one or both boosters.       Follow up with your doctor within 72 hours, or as directed:  You may need to return for more tests to find the cause of your chest pain. You may be referred to a specialist, such as a cardiologist or gastroenterologist. Write down your questions so you remember to ask them during your visits.  © Copyright [employer] 2023 Information is for End User's use only and may not be sold, redistributed or otherwise used for commercial purposes.  The above information is an  only. It is not intended as medical advice for individual conditions or treatments. Talk to your doctor, nurse or pharmacist before following any medical regimen to see if it is safe and effective for you.

## 2024-01-29 NOTE — PROGRESS NOTES
[facility]        NAME: Marshall Barker is a 68 y.o. male  : 1955    MRN: [id number]  DATE: 2024  TIME: 1:30 PM    Assessment and Plan   Burning chest pain [R07.89]  1. Burning chest pain  ECG 12 lead    aluminum-magnesium hydroxide-simethicone (MAALOX) oral suspension 30 mL    Transfer to other facility        EKG interpretation as follows: NSR with HR 72. No ischemic changes. Mylanta given. While could be related to GERD, no aggravating triggers and concern for potential cardiac etiology. Recommend he proceed to ED for further evaluation and management of symptoms. Patient agreeable and wife to transport. Prefers [facility]. Complete assessment deferred to ED.     Patient Instructions     Patient Instructions     Do not drive yourself   Follow up with PCP in 3-5 days.  Proceed to ER for further evaluation and management of symptoms     Chest Pain   AMBULATORY CARE:   Chest pain  can be caused by a range of conditions, from not serious to life-threatening. It is important to follow up with your healthcare provider to find the cause of your chest pain.  Common symptoms you may have with chest pain:   Fever or sweating    Nausea or vomiting    Shortness of breath    Discomfort or pressure that spreads from your chest to your back, jaw, or arm    A racing or slow heartbeat    Feeling weak, tired, or faint    Call your local emergency number (911 in the [place]) or have someone call if:   You have any of the following signs of a heart attack:      Squeezing, pressure, or pain in your chest    You may  also have any of the following:     Discomfort or pain in your back, neck, jaw, stomach, or arm    Shortness of breath    Nausea or vomiting    Lightheadedness or a sudden cold sweat      Seek care immediately if:   You have chest discomfort that gets worse, even with medicine.    You cough or vomit blood.    Your bowel movements are black or bloody.    You cannot stop vomiting, or it hurts  to swallow.    Call your doctor if:   You have questions or concerns about your condition or care.      Treatment for chest pain  may include medicine to treat your symptoms while your healthcare provider finds the cause of your chest pain.  Medicines  may be given to treat the cause of your chest pain. Examples include pain medicine, anxiety medicine, or medicines to increase blood flow to your heart.    Do not take certain medicines without asking your healthcare provider first.  These include NSAIDs, herbal or vitamin supplements, and hormones, such as estrogen or progestin.    One or more stents  may need to be placed in your heart if pain was caused by blockage. A stent is a wire mesh tube that helps hold your artery open.    Healthy living tips:  If the cause of your chest pain is known, your healthcare provider will give you specific guidelines to follow. The following are general healthy guidelines:  Do not smoke.  Nicotine and other chemicals in cigarettes and cigars can cause lung and heart damage. Ask your healthcare provider for information if you currently smoke and need help to quit. E-cigarettes or smokeless tobacco still contain nicotine. Talk to your healthcare provider before you use these products.    Choose a variety of healthy foods as often as possible.  Include fresh, frozen, or canned fruits and vegetables. Also include low-fat dairy products, fish, chicken (without skin), and lean meats. Your healthcare provider or a dietitian can help you create meal plans. You may need to avoid certain foods or drinks if your pain is caused by a digestion problem.         Lower your sodium (salt) intake.  Limit foods that are high in sodium, such as canned foods, salty snacks, and cold cuts. If you add salt when you cook food, do not add more at the table. Choose low-sodium canned foods as much as possible.         Drink plenty of water every day.  Water helps your body to control your temperature and  blood pressure. Ask your healthcare provider how much water you should drink every day.    Ask about activity.  Your healthcare provider will tell you which activities to limit or avoid. Ask when you can drive, return to work, and have sex. Ask about the best exercise plan for you.    Maintain a healthy weight.  Ask your healthcare provider what a healthy weight is for you. Ask him or her to help you create a safe weight loss plan if you are overweight.    Ask about vaccines you may need.  Your healthcare provider can tell you which vaccines you need, and when to get them. The following vaccines help prevent diseases that can become serious for a person with a heart condition:    The influenza (flu) vaccine is given each year.  Get a flu vaccine as soon as recommended, usually in September or October.    The pneumonia vaccine is usually given every 5 years.  Your healthcare provider may recommend the pneumonia vaccine if you are 65 or older.    COVID-19 vaccines are given to adults as a shot in 1 or 2 doses.  Vaccination is recommended for all adults. A booster (additional) dose is also recommended for all adults. A second booster is recommended for all adults 50 or older and for immunocompromised adults 18 or older. The second booster is also recommended for adults who received the 1-dose vaccine for the first and booster doses. Your healthcare provider can tell you when to get one or both boosters.       Follow up with your doctor within 72 hours, or as directed:  You may need to return for more tests to find the cause of your chest pain. You may be referred to a specialist, such as a cardiologist or gastroenterologist. Write down your questions so you remember to ask them during your visits.  © Copyright [employer] 2023 Information is for End User's use only and may not be sold, redistributed or otherwise used for commercial purposes.  The above information is an  only. It is not intended as medical  advice for individual conditions or treatments. Talk to your doctor, nurse or pharmacist before following any medical regimen to see if it is safe and effective for you.      Chief Complaint     Chief Complaint   Patient presents with    Heartburn     Woke up this am with burning in chest with some nausea. No other s/s but got worried so he came in         History of Present Illness       68-year-old male with a past medical history significant for GERD presents with complaints of burning chest pain x 1 day.  Patient reports burning in chest that lasted a few minutes before resolving accompanied by nausea.  Denies fever, chills, or URI symptoms.  No vomiting or diarrhea.  Initially believed symptoms to be related to GERD and did take routine home medications however symptoms persist although lessened.  He denies numbness, tingling, or weakness.  No personal cardiac history.  Symptoms do not feel like normal reflux as he was having upper back pain x2 days as well. No unusual foods and able to eat/drink this morning.     Heartburn  He complains of chest pain, heartburn and nausea. He reports no abdominal pain or no coughing.       Review of Systems   Review of Systems   Constitutional:  Negative for chills and fever.   Respiratory:  Negative for cough and shortness of breath.    Cardiovascular:  Positive for chest pain. Negative for palpitations.   Gastrointestinal:  Positive for heartburn and nausea. Negative for abdominal pain, diarrhea and vomiting.   Skin:  Negative for rash.         Current Medications       Current Outpatient Medications:     Bioflavonoid Products (VITAMIN C PLUS PO), Take 1 tablet by mouth in the morning, Disp: , Rfl:     cetirizine (ZyrTEC) 10 mg tablet, Take 10 mg by mouth daily, Disp: , Rfl:     citalopram (CeleXA) 40 mg tablet, Take 40 mg by mouth daily, Disp: , Rfl:     pantoprazole (PROTONIX) 20 mg tablet, Take 20 mg by mouth daily, Disp: , Rfl:     sertraline (ZOLOFT) 100 mg tablet, Take  "100 mg by mouth daily, Disp: , Rfl:     simvastatin (ZOCOR) 40 mg tablet, Take 40 mg by mouth daily at bedtime, Disp: , Rfl:     VITAMIN D PO, Take 1 tablet by mouth in the morning, Disp: , Rfl:     Zinc Acetate, Oral, (ZINC ACETATE PO), Take 1 tablet by mouth in the morning, Disp: , Rfl:     Current Facility-Administered Medications:     aluminum-magnesium hydroxide-simethicone (MAALOX) oral suspension 30 mL, 30 mL, Oral, Once, XENIA [place]    Current Allergies     Allergies as of 01/29/2024    (No Known Allergies)            The following portions of the patient's history were reviewed and updated as appropriate: allergies, current medications, past family history, past medical history, past social history, past surgical history and problem list.     Past Medical History:   Diagnosis Date    Anxiety     COVID-19     pt had in 2021- does not remember date    GERD (gastroesophageal reflux disease)     Hyperlipidemia     Kidney stone     Pt reports it passed on it's own    Neck mass     Vitamin D deficiency     Wears partial dentures        Past Surgical History:   Procedure Laterality Date    CARPAL TUNNEL RELEASE Bilateral     KNEE ARTHROSCOPY Right     [facility] EXC BAN&/STRPG CORDS/EPIGL MCRSCP/TLSCP N/A 1/11/2023    Procedure: MICRO DIRECT LARYNGOSCOPY WITH VOCAL FOLD STRIPPING WITH FROZEN SECTION ANALYSIS; FLEXIBLE ESOPHAGOSCOPY; FLEXIBLE BRONCHOSCOPY;  Surgeon: Dami Rajput MD;  Location: [facility];  Service: ENT    SUBMANDIBULAR GLAND EXCISION Left 1/11/2023    Procedure: SUBMANDIBULAR GLAND FINE NEEDLE ASPIRATION;  Surgeon: Dami Rajput MD;  Location: [facility];  Service: ENT       Family History   Problem Relation Age of Onset    Other Mother         diverticulitis    Other Father         trauma         Medications have been verified.        Objective   /80   Pulse 83   Resp 16   Ht 5' 9\" (1.753 m)   Wt 79.8 kg (176 lb)   SpO2 98%   BMI 25.99 kg/m²   No LMP for male patient.     "   Physical Exam     Physical Exam  Vitals and nursing note reviewed.   Constitutional:       General: He is not in acute distress.     Appearance: He is not toxic-appearing.   HENT:      Head: Normocephalic.      Nose: Nose normal.      Mouth/Throat:      Mouth: Mucous membranes are moist.      Pharynx: Oropharynx is clear.   Eyes:      Conjunctiva/sclera: Conjunctivae normal.   Cardiovascular:      Rate and Rhythm: Normal rate and regular rhythm.      Heart sounds: Normal heart sounds.   Pulmonary:      Effort: Pulmonary effort is normal. No respiratory distress.      Breath sounds: Normal breath sounds. No stridor. No wheezing, rhonchi or rales.   Abdominal:      General: Bowel sounds are normal.      Palpations: Abdomen is soft.      Tenderness: There is no abdominal tenderness.   Skin:     General: Skin is warm and dry.   Neurological:      Mental Status: He is alert and oriented to person, place, and time.      Gait: Gait is intact.   Psychiatric:         Mood and Affect: Mood normal.         Behavior: Behavior normal.

## 2024-01-31 LAB
ATRIAL RATE: 72 BPM
P AXIS: 31 DEGREES
PR INTERVAL: 158 MS
QRS AXIS: -15 DEGREES
QRSD INTERVAL: 94 MS
QT INTERVAL: 390 MS
QTC INTERVAL: 427 MS
T WAVE AXIS: 15 DEGREES
VENTRICULAR RATE: 72 BPM

## 2024-01-31 PROCEDURE — 93010 ELECTROCARDIOGRAM REPORT: CPT | Performed by: INTERNAL MEDICINE

## (undated) DEVICE — MAYO STAND COVER: Brand: CONVERTORS

## (undated) DEVICE — TUBING SUCTION 5MM X 12 FT

## (undated) DEVICE — INTENDED FOR TISSUE SEPARATION, AND OTHER PROCEDURES THAT REQUIRE A SHARP SURGICAL BLADE TO PUNCTURE OR CUT.: Brand: BARD-PARKER SAFETY BLADES SIZE 15, STERILE

## (undated) DEVICE — GLOVE INDICATOR PI UNDERGLOVE SZ 8 BLUE

## (undated) DEVICE — CHLORAPREP HI-LITE 10.5ML ORANGE

## (undated) DEVICE — TIBURON SPLIT SHEET: Brand: CONVERTORS

## (undated) DEVICE — HYBRID CO2 TUBING/CAP SET FOR OLYMPUS® SCOPES: Brand: ERBE

## (undated) DEVICE — PLASTIC ADHESIVE BANDAGE: Brand: CURITY

## (undated) DEVICE — MEDI-VAC YANK SUCT HNDL W/TPRD BULBOUS TIP: Brand: CARDINAL HEALTH

## (undated) DEVICE — AMBU ASCOPE 4 RHINOLARYNGO SLIM SINGLE-USE, FLEXIBLE RHINOLARYNGOSCOPE STERILE FROM PACKAGE. INSERTION CORD DIAMETER 3.0 MM, LENGHT OF 300 MM. AND A 130-DEGREE BENDING ANGLE. MINIMUM PURCHASE 5 PCS = 1 BOX: Brand: ASCOPE™ 4 RHINOLARYNGO SLIM

## (undated) DEVICE — ENDOSCOPY PORT CONNECTOR FOR OLYMPUS® SCOPES: Brand: ERBE

## (undated) DEVICE — STERILIZATION TEETH GUARDS

## (undated) DEVICE — BETHLEHEM UNIVERSAL OUTPATIENT: Brand: CARDINAL HEALTH

## (undated) DEVICE — NEEDLE 18 G X 1 1/2

## (undated) DEVICE — DISPOSABLE OR TOWEL: Brand: CARDINAL HEALTH

## (undated) DEVICE — HYDROPHILIC WOUND DRESSING WITH ZINC PLUS VITAMINS A AND B6.: Brand: DERMAGRAN®-B

## (undated) DEVICE — SPECIMEN CONTAINER STERILE PEEL PACK

## (undated) DEVICE — SINGLE PORT MANIFOLD: Brand: NEPTUNE 2

## (undated) DEVICE — AMBU ASCOPE 4 BRONCHO SLIM 3.8/1.2 SINGLE-USE AND FLEXIBLE BROCHOSCOPE. PORTABLE AND STERILE FROM THE PACK. SUITABLE FOR INTUBATION, PLACING AND CHECKING DOUBLE-LUMEN TUBES AND BRONCHIAL BLOCKERS AND AINTREE CATHETER. BENDING SECTION: 180° UP/180° DOWN INSERT CORD DIAMETER: 3,8 MM, WORKING LENGHT: 600 MM MINIMUM PURCHASE IS 5 PCS = 1 BOX: Brand: ASCOPE™ 4 BRONCHO  SLIM  3.8/1.2FLEXIBLE ENDOSCOPE - SINGLE USE

## (undated) DEVICE — DRESSING TELFA 2 X 3 IN STRL

## (undated) DEVICE — SKIN MARKER DUAL TIP WITH RULER CAP, FLEXIBLE RULER AND LABELS: Brand: DEVON